# Patient Record
Sex: MALE | Race: WHITE | NOT HISPANIC OR LATINO | Employment: OTHER | URBAN - METROPOLITAN AREA
[De-identification: names, ages, dates, MRNs, and addresses within clinical notes are randomized per-mention and may not be internally consistent; named-entity substitution may affect disease eponyms.]

---

## 2019-09-25 LAB — HBA1C MFR BLD HPLC: 5.7 %

## 2019-09-30 ENCOUNTER — OFFICE VISIT (OUTPATIENT)
Dept: GASTROENTEROLOGY | Facility: CLINIC | Age: 69
End: 2019-09-30
Payer: COMMERCIAL

## 2019-09-30 VITALS
WEIGHT: 241 LBS | HEART RATE: 50 BPM | DIASTOLIC BLOOD PRESSURE: 86 MMHG | HEIGHT: 71 IN | BODY MASS INDEX: 33.74 KG/M2 | SYSTOLIC BLOOD PRESSURE: 122 MMHG

## 2019-09-30 DIAGNOSIS — Z12.11 ENCOUNTER FOR SCREENING COLONOSCOPY: Primary | ICD-10-CM

## 2019-09-30 DIAGNOSIS — R07.89 ATYPICAL CHEST PAIN: ICD-10-CM

## 2019-09-30 DIAGNOSIS — Z80.0 FAMILY HISTORY OF COLON CANCER IN FATHER: ICD-10-CM

## 2019-09-30 DIAGNOSIS — K21.9 GASTROESOPHAGEAL REFLUX DISEASE WITHOUT ESOPHAGITIS: ICD-10-CM

## 2019-09-30 DIAGNOSIS — Z79.01 CURRENT USE OF LONG TERM ANTICOAGULATION: ICD-10-CM

## 2019-09-30 DIAGNOSIS — Z80.0 FAMILY HISTORY OF COLON CANCER IN FATHER: Primary | ICD-10-CM

## 2019-09-30 PROCEDURE — 99214 OFFICE O/P EST MOD 30 MIN: CPT | Performed by: NURSE PRACTITIONER

## 2019-09-30 RX ORDER — ATORVASTATIN CALCIUM 80 MG/1
80 TABLET, FILM COATED ORAL DAILY
COMMUNITY
Start: 2017-06-06

## 2019-09-30 RX ORDER — DOFETILIDE 0.5 MG/1
1 CAPSULE ORAL EVERY 12 HOURS
COMMUNITY

## 2019-09-30 RX ORDER — OMEPRAZOLE 40 MG/1
40 CAPSULE, DELAYED RELEASE ORAL 2 TIMES DAILY
COMMUNITY
Start: 2018-10-28

## 2019-09-30 RX ORDER — METOPROLOL SUCCINATE 50 MG/1
TABLET, EXTENDED RELEASE ORAL
COMMUNITY
Start: 2019-07-29

## 2019-09-30 RX ORDER — AMLODIPINE BESYLATE 10 MG/1
10 TABLET ORAL DAILY
COMMUNITY
Start: 2016-06-15

## 2019-09-30 RX ORDER — LISINOPRIL 2.5 MG/1
2.5 TABLET ORAL DAILY
COMMUNITY
Start: 2018-10-16

## 2019-09-30 NOTE — PROGRESS NOTES
2049 "[id number] [facility] - Outpatient Follow-up Note  Vena Squibb 71 y o  male MRN: [id number]  Encounter: [id number]    ASSESSMENT AND PLAN:      1  Encounter for screening colonoscopy  3  Family history of colon cancer in father  High risk  Due for repeat screening  Last colonoscopy 10/24/2014 showed previous ileal colonic anastomosis in the ascending colon, grade 1 internal hemorrhoids, sigmoid diverticulosis but normal colonic mucosa  Five year recall was recommended  2  Current use of long term anticoagulation  Eliquis for atrial fibrillation and a stroke from 2015  Reviewed with patient the small risk of thromboembolic event while holding the Eliquis selin procedure  The patient understands the risks and benefits of holding the medication and agrees to proceed pending approval of the prescribing provider, Dr Cb Rodarte  4  Gastroesophageal reflux disease without esophagitis  5  Atypical chest pain  Symptoms occur randomly about once per month but last to 20 minutes  No improvement since changing to omeprazole  Cardiac workup has been unremarkable  Last EGD 06/14/2017 was completely normal   This could be esophageal spasm or less likely biliary disease  He would like to follow up with Dr Tania Whitney to address these concerns after his colonoscopy  I advised continuing lifestyle modifications and his daily PPI until that time  Followup Appointment:  4 weeks with Dr Good______________________________________________________________________    Chief Complaint   Patient presents with    Due for colonoscopy     HPI:  70-year-old male patient returns to the office as he is due for his screening colonoscopy  Reports he is in his usual state of health  Denies increased fatigue from his baseline (he does have some fatigue and weakness from his previous stroke in 2015)    Denies fever/chills, chest pain, shortness of breath, nausea/vomiting, heartburn, change in bowel habits, melena, hematochezia, diarrhea or constipation  He has 1-2 normal bowel movements daily  He is experiencing reflux with atypical chest pain described as sharp in the left and right sternal areas lasting about 20 minutes per episode  Cannot relate to specific food triggers or meals but does have increased belching with the episodes  Avoids late night eating and elevates his head at night  He has not tried any other treatments or medications for these symptoms  He did explore the symptoms with the cardiologist in PCP with no cardiac etiology  He had what he describes as normal routine lab work last week per PCP  He did previous lose 40 lb intentionally to improve his health and exercises 3-4 times per week  Appetite is normal   No unintended weight loss      Historical Information   Past Medical History:   Diagnosis Date    Atrial fibrillation (HCC)     LVH    GERD (gastroesophageal reflux disease)     Hyperlipidemia     Hypertension     Stroke ([facility] [address] ) 2015    some residual visual defects      Past Surgical History:   Procedure Laterality Date    APPENDECTOMY      COLON SURGERY Right     Done for appendicitis with cecal abscess    COLONOSCOPY  10/24/2014    COLONOSCOPY  10/24/2014    Previous ileal colonic anastomosis in the ascending colon, grade 1 internal hemorrhoids, sigmoid diverticulosis    EGD  06/14/2017    UPPER GASTROINTESTINAL ENDOSCOPY  06/14/2017    Normal     Social History     Substance and Sexual Activity   Alcohol Use Yes    Frequency: Monthly or less    Drinks per session: 1 or 2    Binge frequency: Never    Comment: Very rare social     Social History     Substance and Sexual Activity   Drug Use Never     Social History     Tobacco Use   Smoking Status Never Smoker   Smokeless Tobacco Never Used     Family History   Problem Relation Age of Onset    Diabetes Mother     Hypertension Mother     Colon cancer Father [de-identified]    Hypertension Father     Diabetes Father     Heart disease Sister     Colon polyps Sister     No Known Problems Brother     Heart disease Sister     Pneumonia Sister     Kidney cancer Sister          Current Outpatient Medications:     amLODIPine (NORVASC) 10 mg tablet    apixaban (ELIQUIS) 5 mg    atorvastatin (LIPITOR) 80 mg tablet    dofetilide (TIKOSYN) 500 mcg capsule    lisinopril (ZESTRIL) 2 5 mg tablet    metoprolol succinate (TOPROL-XL) 50 mg 24 hr tablet    omeprazole (PriLOSEC) 40 MG capsule  No Known Allergies     ROS:  All systems were reviewed and positives reported as per HPI  Also reports occasional leg cramps  All other systems reported as negative  PHYSICAL EXAM:    Blood pressure 122/86, pulse (Abnormal) 50, height 5' 11" (1 803 m), weight 109 kg (241 lb)  Body mass index is 33 61 kg/m²  General Appearance: NAD, cooperative, alert  Head:  Normocephalic, atraumatic  Eyes: Anicteric, PERRLA, EOMI  ENT:   normal external appearance, normal mucosa  Neck:  Supple, symmetrical, trachea midline  Resp:  Clear to auscultation bilaterally; no rales, rhonchi or wheezing; respirations unlabored   CV:  S1 S2, Regular rate and rhythm; no murmur, rub, or gallop  GI:  Soft, non-tender, non-distended; normal bowel sounds; no masses, no organomegaly   Rectal: Deferred  Musculoskeletal: No cyanosis, clubbing or edema  Normal ROM  Skin:  No jaundice, rashes, or lesions   Heme/Lymph: No palpable cervical lymphadenopathy  Psych: Normal affect, good eye contact  Neuro: No gross deficits, AAOx3    Lab Results:  Reviewed lab results from January, 2019  Will obtain recent lab work from [employer] for review  No results found for: WBC, HGB, HCT, MCV, PLT  No results found for: NA, K, CL, CO2, ANIONGAP, BUN, CREATININE, GLUCOSE, GLUF, CALCIUM, CORRECTEDCA, AST, ALT, ALKPHOS, PROT, BILITOT, EGFR  No results found for: IRON, TIBC, FERRITIN  No results found for: LIPASE    Radiology Results:   No results found

## 2021-03-09 DIAGNOSIS — Z23 ENCOUNTER FOR IMMUNIZATION: ICD-10-CM

## 2022-04-06 NOTE — PATIENT INSTRUCTIONS
Group Therapy Note    Date: 4/6/2022    Group Start Time: 1000  Group End Time: 7963  Group Topic: Group Therapy    [facility]    BARBARA Sierra        Group Therapy Note  Pt declined to attend psychotherapy at 1000 am despite encouragement. Pt offered 1:1 and refused.       Attendees: 6/13           Signature:  BARBARA Sierra You will have a colonoscopy here at our endoscopy center  We will contact Dr Ta Cooper about stopping year Eliquis prior to the colonoscopy  Someone will be in touch with you with further instructions

## 2022-12-13 ENCOUNTER — TELEPHONE (OUTPATIENT)
Dept: OTHER | Facility: OTHER | Age: 72
End: 2022-12-13

## 2024-07-31 ENCOUNTER — TELEPHONE (OUTPATIENT)
Age: 74
End: 2024-07-31

## 2024-07-31 NOTE — TELEPHONE ENCOUNTER
07/31/24  Screened by: Lory Ambrosio MA    Referring Provider recall    Pre- Screening:     There is no height or weight on file to calculate BMI.  Has patient been referred for a routine screening Colonoscopy? yes  Is the patient between 45-75 years old? yes      Previous Colonoscopy yes   If yes:    Date: 10-    Facility: Dr. Good    Reason: diverticulosis of the sidmoid colon      SCHEDULING STAFF: If the patient is between 45yrs-49yrs, please advise patient to confirm benefits/coverage with their insurance company for a routine screening colonoscopy, some insurance carriers will only cover at 50yrs or older. If the patient is over 75years old, please schedule an office visit.     Does the patient want to see a Gastroenterologist prior to their procedure OR are they having any GI symptoms? no    Has the patient been hospitalized or had abdominal surgery in the past 6 months? no    Does the patient use supplemental oxygen? no    Does the patient take Coumadin, Lovenox, Plavix, Elliquis, Xarelto, or other blood thinning medication? yes    Has the patient had a stroke, cardiac event, or stent placed in the past year? no    SCHEDULING STAFF: If patient answers NO to above questions, then schedule procedure. If patient answers YES to above questions, then schedule office appointment.     If patient is between 45yrs - 49yrs, please advise patient that we will have to confirm benefits & coverage with their insurance company for a routine screening colonoscopy.

## 2024-09-30 ENCOUNTER — TELEPHONE (OUTPATIENT)
Dept: GASTROENTEROLOGY | Facility: CLINIC | Age: 74
End: 2024-09-30

## 2024-09-30 ENCOUNTER — DOCUMENTATION (OUTPATIENT)
Dept: GASTROENTEROLOGY | Facility: CLINIC | Age: 74
End: 2024-09-30

## 2024-09-30 ENCOUNTER — OFFICE VISIT (OUTPATIENT)
Dept: GASTROENTEROLOGY | Facility: CLINIC | Age: 74
End: 2024-09-30
Payer: COMMERCIAL

## 2024-09-30 VITALS
DIASTOLIC BLOOD PRESSURE: 74 MMHG | BODY MASS INDEX: 33.65 KG/M2 | WEIGHT: 240.4 LBS | SYSTOLIC BLOOD PRESSURE: 152 MMHG | HEIGHT: 71 IN

## 2024-09-30 DIAGNOSIS — K21.9 GASTROESOPHAGEAL REFLUX DISEASE WITHOUT ESOPHAGITIS: ICD-10-CM

## 2024-09-30 DIAGNOSIS — Z79.01 CURRENT USE OF LONG TERM ANTICOAGULATION: ICD-10-CM

## 2024-09-30 DIAGNOSIS — I48.91 ATRIAL FIBRILLATION, UNSPECIFIED TYPE (HCC): ICD-10-CM

## 2024-09-30 DIAGNOSIS — Z80.0 FAMILY HISTORY OF COLON CANCER IN FATHER: Primary | ICD-10-CM

## 2024-09-30 PROCEDURE — 99204 OFFICE O/P NEW MOD 45 MIN: CPT | Performed by: INTERNAL MEDICINE

## 2024-09-30 NOTE — LETTER
September 30, 2024     Britany Joe MD  [address]  [facility] [address]    Patient: Edenilson Avila   YOB: 1950   Date of Visit: 9/30/2024       Dear Dr. Joe:    Thank you for referring Edenilson Avila to me for evaluation. Below are my notes for this consultation.    If you have questions, please do not hesitate to call me. I look forward to following your patient along with you.         Sincerely,        Bi Good MD        CC: MD Bi Mccall MD  9/30/2024  9:56 AM  Sign when Signing Visit  [facility] - Outpatient Consultation  Edenilson Avila 74 y.o. male MRN: [id number]  Encounter: [id number]          ASSESSMENT AND PLAN:      1. Family history of colon cancer in father  Last colonoscopy 2019  - Colonoscopy; Future    2. Atrial fibrillation, unspecified type (HCC)  3. Current use of long term anticoagulation  On chronic Eliquis secondary to atrial fibrillation.  Followed by Dr. Ashlyn Ribeiro at [facility]  -Will hold Eliquis for 2 days prior to procedure if okay with cardiology.  Patient retains the potential thromboembolic risks    4. Gastroesophageal reflux disease without esophagitis  Well on omeprazole    ______________________________________________________________________    HPI: This is 74-year-old gentleman history of GERD atrial fibrillation on Eliquis who is seen in the office today for evaluation for colonoscopy.  His father had colon cancer.  He has been getting a colonoscopy about every 5 years with the last one being 2019.  From a GI standpoint he is doing well.  He has bowels regularly.  Denies any rectal bleeding or abdominal pain.  His weight is stable.  His GERD is adequately controlled with the omeprazole.  He does report some nausea in the morning which he attributes to taking his pills and may be milk.  He denies any vomiting.  Denies any dysphagia,  odynophagia, early satiety.  His weight is stable.      REVIEW OF SYSTEMS:    CONSTITUTIONAL: Denies any fever, chills, rigors, and weight loss.  HEENT: No earache or tinnitus. Denies hearing loss or visual disturbances.  CARDIOVASCULAR: No chest pain or palpitations.   RESPIRATORY: Denies any cough, hemoptysis, shortness of breath or dyspnea on exertion.  GASTROINTESTINAL: As noted in the History of Present Illness.   GENITOURINARY: No problems with urination. Denies any hematuria or dysuria.  NEUROLOGIC: No dizziness or vertigo, denies headaches.   MUSCULOSKELETAL: Denies any muscle or joint pain.   SKIN: Denies skin rashes or itching.   ENDOCRINE: Denies excessive thirst. Denies intolerance to heat or cold.  PSYCHOSOCIAL: Denies depression or anxiety. Denies any recent memory loss.       Historical Information  Past Medical History:   Diagnosis Date   • Atrial fibrillation (HCC)     LVH   • GERD (gastroesophageal reflux disease)    • Hyperlipidemia    • Hypertension    • Stroke (HCC) 2015    some residual visual defects      Past Surgical History:   Procedure Laterality Date   • APPENDECTOMY     • COLON SURGERY Right     Done for appendicitis with cecal abscess   • COLONOSCOPY  10/24/2014   • COLONOSCOPY  10/24/2014    Previous ileal colonic anastomosis in the ascending colon, grade 1 internal hemorrhoids, sigmoid diverticulosis   • EGD  06/14/2017   • FL GUIDED NEEDLE PLAC BX/ASP/INJ  8/25/2021   • UPPER GASTROINTESTINAL ENDOSCOPY  06/14/2017    Normal     Social History  Social History     Substance and Sexual Activity   Alcohol Use Yes    Comment: Very rare social     Social History     Substance and Sexual Activity   Drug Use Never     Social History     Tobacco Use   Smoking Status Never   Smokeless Tobacco Never     Family History   Problem Relation Age of Onset   • Diabetes Mother    • Hypertension Mother    • Colon cancer Father 80   • Hypertension Father    • Diabetes Father    • Heart disease Sister   "  • Colon polyps Sister    • No Known Problems Brother    • Heart disease Sister    • Pneumonia Sister    • Kidney cancer Sister        Meds/Allergies      Current Outpatient Medications:   •  amLODIPine (NORVASC) 10 mg tablet  •  apixaban (ELIQUIS) 5 mg  •  atorvastatin (LIPITOR) 80 mg tablet  •  dofetilide (TIKOSYN) 500 mcg capsule  •  lisinopril (ZESTRIL) 2.5 mg tablet  •  omeprazole (PriLOSEC) 40 MG capsule  •  metoprolol succinate (TOPROL-XL) 50 mg 24 hr tablet    No Known Allergies        Objective    Blood pressure 152/74, height 5' 11\" (1.803 m), weight 109 kg (240 lb 6.4 oz). Body mass index is 33.53 kg/m².        PHYSICAL EXAM:      General Appearance:   Alert, cooperative, no distress   HEENT:   Normocephalic, atraumatic, anicteric.     Neck:  Supple, symmetrical, trachea midline   Lungs:   Clear to auscultation bilaterally; no rales, rhonchi or wheezing; respirations unlabored    Heart::   Regular rate and rhythm; no murmur, rub, or gallop.   Abdomen:   Soft, non-tender, non-distended; normal bowel sounds; no masses, no organomegaly    Genitalia:   Deferred    Rectal:   Deferred    Extremities:  No cyanosis, clubbing or edema    Pulses:  2+ and symmetric    Skin:  No jaundice, rashes, or lesions    Lymph nodes:  No palpable cervical lymphadenopathy        Lab Results:   None recently    "

## 2024-09-30 NOTE — TELEPHONE ENCOUNTER
Scheduled date of colonoscopy (as of today):  10-31-24  Physician performing colonoscopy:  Latisha  Location of colonoscopy:  [facility]  Bowel prep reviewed with patient:  Mahin   gave sample  Instructions reviewed with patient by:olga  Clearances: yes  Eliquis

## 2024-09-30 NOTE — PROGRESS NOTES
[facility] - Outpatient Consultation  Edenilson Avila 74 y.o. male MRN: [id number]  Encounter: [id number]          ASSESSMENT AND PLAN:      1. Family history of colon cancer in father  Last colonoscopy 2019  - Colonoscopy; Future    2. Atrial fibrillation, unspecified type (HCC)  3. Current use of long term anticoagulation  On chronic Eliquis secondary to atrial fibrillation.  Followed by Dr. Ashlyn Ribeiro at [facility]  -Will hold Eliquis for 2 days prior to procedure if okay with cardiology.  Patient retains the potential thromboembolic risks    4. Gastroesophageal reflux disease without esophagitis  Well on omeprazole    ______________________________________________________________________    HPI: This is 74-year-old gentleman history of GERD atrial fibrillation on Eliquis who is seen in the office today for evaluation for colonoscopy.  His father had colon cancer.  He has been getting a colonoscopy about every 5 years with the last one being 2019.  From a GI standpoint he is doing well.  He has bowels regularly.  Denies any rectal bleeding or abdominal pain.  His weight is stable.  His GERD is adequately controlled with the omeprazole.  He does report some nausea in the morning which he attributes to taking his pills and may be milk.  He denies any vomiting.  Denies any dysphagia, odynophagia, early satiety.  His weight is stable.      REVIEW OF SYSTEMS:    CONSTITUTIONAL: Denies any fever, chills, rigors, and weight loss.  HEENT: No earache or tinnitus. Denies hearing loss or visual disturbances.  CARDIOVASCULAR: No chest pain or palpitations.   RESPIRATORY: Denies any cough, hemoptysis, shortness of breath or dyspnea on exertion.  GASTROINTESTINAL: As noted in the History of Present Illness.   GENITOURINARY: No problems with urination. Denies any hematuria or dysuria.  NEUROLOGIC: No dizziness or vertigo, denies headaches.   MUSCULOSKELETAL: Denies  "any muscle or joint pain.   SKIN: Denies skin rashes or itching.   ENDOCRINE: Denies excessive thirst. Denies intolerance to heat or cold.  PSYCHOSOCIAL: Denies depression or anxiety. Denies any recent memory loss.       Historical Information   Past Medical History:   Diagnosis Date    Atrial fibrillation (HCC)     LVH    GERD (gastroesophageal reflux disease)     Hyperlipidemia     Hypertension     Stroke (HCC) 2015    some residual visual defects      Past Surgical History:   Procedure Laterality Date    APPENDECTOMY      COLON SURGERY Right     Done for appendicitis with cecal abscess    COLONOSCOPY  10/24/2014    COLONOSCOPY  10/24/2014    Previous ileal colonic anastomosis in the ascending colon, grade 1 internal hemorrhoids, sigmoid diverticulosis    EGD  06/14/2017    FL GUIDED NEEDLE PLAC BX/ASP/INJ  8/25/2021    UPPER GASTROINTESTINAL ENDOSCOPY  06/14/2017    Normal     Social History   Social History     Substance and Sexual Activity   Alcohol Use Yes    Comment: Very rare social     Social History     Substance and Sexual Activity   Drug Use Never     Social History     Tobacco Use   Smoking Status Never   Smokeless Tobacco Never     Family History   Problem Relation Age of Onset    Diabetes Mother     Hypertension Mother     Colon cancer Father 80    Hypertension Father     Diabetes Father     Heart disease Sister     Colon polyps Sister     No Known Problems Brother     Heart disease Sister     Pneumonia Sister     Kidney cancer Sister        Meds/Allergies       Current Outpatient Medications:     amLODIPine (NORVASC) 10 mg tablet    apixaban (ELIQUIS) 5 mg    atorvastatin (LIPITOR) 80 mg tablet    dofetilide (TIKOSYN) 500 mcg capsule    lisinopril (ZESTRIL) 2.5 mg tablet    omeprazole (PriLOSEC) 40 MG capsule    metoprolol succinate (TOPROL-XL) 50 mg 24 hr tablet    No Known Allergies        Objective     Blood pressure 152/74, height 5' 11\" (1.803 m), weight 109 kg (240 lb 6.4 oz). Body mass index " is 33.53 kg/m².        PHYSICAL EXAM:      General Appearance:   Alert, cooperative, no distress   HEENT:   Normocephalic, atraumatic, anicteric.     Neck:  Supple, symmetrical, trachea midline   Lungs:   Clear to auscultation bilaterally; no rales, rhonchi or wheezing; respirations unlabored    Heart::   Regular rate and rhythm; no murmur, rub, or gallop.   Abdomen:   Soft, non-tender, non-distended; normal bowel sounds; no masses, no organomegaly    Genitalia:   Deferred    Rectal:   Deferred    Extremities:  No cyanosis, clubbing or edema    Pulses:  2+ and symmetric    Skin:  No jaundice, rashes, or lesions    Lymph nodes:  No palpable cervical lymphadenopathy        Lab Results:   None recently

## 2024-10-02 NOTE — TELEPHONE ENCOUNTER
Provider: Latisha  Procedure: Colon  Scheduled Date: 10-31-24  Location: [facility]  Medication(s) to stop: Eliquis  Days to hold: 2  Last dose should be: 10-28-24  Requested from: [facility]  Date requested: 9-30-24  Date received:  10-1-24  Patient alerted:

## 2024-10-17 ENCOUNTER — ANESTHESIA EVENT (OUTPATIENT)
Dept: ANESTHESIOLOGY | Facility: AMBULATORY SURGERY CENTER | Age: 74
End: 2024-10-17

## 2024-10-17 ENCOUNTER — ANESTHESIA (OUTPATIENT)
Dept: ANESTHESIOLOGY | Facility: AMBULATORY SURGERY CENTER | Age: 74
End: 2024-10-17

## 2024-10-21 ENCOUNTER — TELEPHONE (OUTPATIENT)
Dept: GASTROENTEROLOGY | Facility: CLINIC | Age: 74
End: 2024-10-21

## 2024-10-21 NOTE — TELEPHONE ENCOUNTER
Procedure confirmed  Colonoscopy     Via: Voice mail    Instructions given: Given to Patient at Visit     Prep Given: Clenpiq    Call the office if there are any questions.

## 2024-10-29 ENCOUNTER — TELEPHONE (OUTPATIENT)
Dept: GASTROENTEROLOGY | Facility: CLINIC | Age: 74
End: 2024-10-29

## 2024-10-29 NOTE — TELEPHONE ENCOUNTER
Procedure rescheduled due to not stopping Eliquis.    Scheduled date of colonoscopy (as of today):11/01/2024  Physician performing colonoscopy:Dr Uribe  Location of colonoscopy:[facility]  Bowel prep reviewed with patient:Clenpiq (sample given)  Patient has instructions   Clearances: Eliquis

## 2024-11-01 ENCOUNTER — HOSPITAL ENCOUNTER (OUTPATIENT)
Dept: GASTROENTEROLOGY | Facility: AMBULATORY SURGERY CENTER | Age: 74
Discharge: HOME | End: 2024-11-01
Payer: COMMERCIAL

## 2024-11-01 ENCOUNTER — ANESTHESIA EVENT (OUTPATIENT)
Dept: GASTROENTEROLOGY | Facility: AMBULATORY SURGERY CENTER | Age: 74
End: 2024-11-01

## 2024-11-01 ENCOUNTER — ANESTHESIA (OUTPATIENT)
Dept: GASTROENTEROLOGY | Facility: AMBULATORY SURGERY CENTER | Age: 74
End: 2024-11-01

## 2024-11-01 VITALS
BODY MASS INDEX: 33.6 KG/M2 | TEMPERATURE: 97.8 F | HEART RATE: 62 BPM | OXYGEN SATURATION: 96 % | DIASTOLIC BLOOD PRESSURE: 71 MMHG | RESPIRATION RATE: 18 BRPM | SYSTOLIC BLOOD PRESSURE: 123 MMHG | HEIGHT: 71 IN | WEIGHT: 240 LBS

## 2024-11-01 DIAGNOSIS — Z80.0 FAMILY HISTORY OF COLON CANCER IN FATHER: ICD-10-CM

## 2024-11-01 PROCEDURE — G0105 COLORECTAL SCRN; HI RISK IND: HCPCS | Performed by: INTERNAL MEDICINE

## 2024-11-01 RX ORDER — EPHEDRINE SULFATE 50 MG/ML
INJECTION INTRAVENOUS AS NEEDED
Status: DISCONTINUED | OUTPATIENT
Start: 2024-11-01 | End: 2024-11-01

## 2024-11-01 RX ORDER — SODIUM CHLORIDE, SODIUM LACTATE, POTASSIUM CHLORIDE, CALCIUM CHLORIDE 600; 310; 30; 20 MG/100ML; MG/100ML; MG/100ML; MG/100ML
50 INJECTION, SOLUTION INTRAVENOUS CONTINUOUS
Status: DISCONTINUED | OUTPATIENT
Start: 2024-11-01 | End: 2024-11-05 | Stop reason: HOSPADM

## 2024-11-01 RX ORDER — PROPOFOL 10 MG/ML
INJECTION, EMULSION INTRAVENOUS AS NEEDED
Status: DISCONTINUED | OUTPATIENT
Start: 2024-11-01 | End: 2024-11-01

## 2024-11-01 RX ORDER — LIDOCAINE HYDROCHLORIDE 10 MG/ML
INJECTION, SOLUTION EPIDURAL; INFILTRATION; INTRACAUDAL; PERINEURAL AS NEEDED
Status: DISCONTINUED | OUTPATIENT
Start: 2024-11-01 | End: 2024-11-01

## 2024-11-01 RX ADMIN — EPHEDRINE SULFATE 5 MG: 50 INJECTION INTRAVENOUS at 09:42

## 2024-11-01 RX ADMIN — PROPOFOL 30 MG: 10 INJECTION, EMULSION INTRAVENOUS at 09:30

## 2024-11-01 RX ADMIN — LIDOCAINE HYDROCHLORIDE 30 MG: 10 INJECTION, SOLUTION EPIDURAL; INFILTRATION; INTRACAUDAL; PERINEURAL at 09:29

## 2024-11-01 RX ADMIN — PROPOFOL 30 MG: 10 INJECTION, EMULSION INTRAVENOUS at 09:37

## 2024-11-01 RX ADMIN — SODIUM CHLORIDE, SODIUM LACTATE, POTASSIUM CHLORIDE, CALCIUM CHLORIDE 50 ML/HR: 600; 310; 30; 20 INJECTION, SOLUTION INTRAVENOUS at 09:24

## 2024-11-01 RX ADMIN — PROPOFOL 100 MG: 10 INJECTION, EMULSION INTRAVENOUS at 09:29

## 2024-11-01 RX ADMIN — PROPOFOL 20 MG: 10 INJECTION, EMULSION INTRAVENOUS at 09:31

## 2024-11-01 RX ADMIN — PROPOFOL 20 MG: 10 INJECTION, EMULSION INTRAVENOUS at 09:43

## 2024-11-01 RX ADMIN — PROPOFOL 30 MG: 10 INJECTION, EMULSION INTRAVENOUS at 09:33

## 2024-11-01 RX ADMIN — PROPOFOL 20 MG: 10 INJECTION, EMULSION INTRAVENOUS at 09:34

## 2024-11-01 NOTE — H&P
History and Physical - [facility]  Edenilson Avila 74 y.o. male MRN: [id number]    HPI: Edenilson Avila is a 74 y.o. year old male who presents for colon cancer screening, family history of colon cancer    REVIEW OF SYSTEMS: Per the HPI, and otherwise unremarkable.    Historical Information   Past Medical History:   Diagnosis Date    Atrial fibrillation (HCC)     LVH    CPAP (continuous positive airway pressure) dependence     GERD (gastroesophageal reflux disease)     Hyperlipidemia     Hypertension     Sleep apnea     Stroke (HCC) 2015    some residual visual defects      Past Surgical History:   Procedure Laterality Date    APPENDECTOMY      COLON SURGERY Right     Done for appendicitis with cecal abscess    COLONOSCOPY  10/24/2014    COLONOSCOPY  10/24/2014    Previous ileal colonic anastomosis in the ascending colon, grade 1 internal hemorrhoids, sigmoid diverticulosis    EGD  06/14/2017    FL GUIDED NEEDLE PLAC BX/ASP/INJ  8/25/2021    UPPER GASTROINTESTINAL ENDOSCOPY  06/14/2017    Normal     Social History   Social History     Substance and Sexual Activity   Alcohol Use Yes    Comment: Very rare social     Social History     Substance and Sexual Activity   Drug Use Never     Social History     Tobacco Use   Smoking Status Never   Smokeless Tobacco Never     Family History   Problem Relation Age of Onset    Diabetes Mother     Hypertension Mother     Colon cancer Father 80    Hypertension Father     Diabetes Father     Heart disease Sister     Colon polyps Sister     No Known Problems Brother     Heart disease Sister     Pneumonia Sister     Kidney cancer Sister        Meds/Allergies       Current Outpatient Medications:     amLODIPine (NORVASC) 10 mg tablet    atorvastatin (LIPITOR) 80 mg tablet    dofetilide (TIKOSYN) 500 mcg capsule    lisinopril (ZESTRIL) 2.5 mg tablet    omeprazole (PriLOSEC) 40 MG capsule    apixaban (ELIQUIS) 5 mg    metoprolol succinate (TOPROL-XL) 50 mg 24 hr  tablet    Current Facility-Administered Medications:     lactated ringers infusion, 50 mL/hr, Intravenous, Continuous    No Known Allergies    Objective     There were no vitals taken for this visit.    PHYSICAL EXAM    Gen: NAD AAOx3  Head: Normocephalic, Atraumatic  CV: S1S2 RRR no m/r/g  CHEST: Clear b/l no c/r/w  ABD: soft, +BS NT/ND  EXT: no edema    ASSESSMENT/PLAN:  This is a 74 y.o. year old male here for colonoscopy, and he is stable and optimized for his procedure.

## 2024-11-01 NOTE — ANESTHESIA PREPROCEDURE EVALUATION
Procedure:  COLONOSCOPY    Relevant Problems   ANESTHESIA (within normal limits)      CARDIO   (+) Atrial fibrillation (HCC)   (+) Atypical chest pain      ENDO (within normal limits)      GI/HEPATIC   (+) Gastroesophageal reflux disease without esophagitis      /RENAL (within normal limits)      GYN (within normal limits)      HEMATOLOGY (within normal limits)      MUSCULOSKELETAL (within normal limits)      NEURO/PSYCH (within normal limits)      PULMONARY (within normal limits)        Physical Exam    Airway       Dental       Cardiovascular  Cardiovascular exam normal    Pulmonary  Pulmonary exam normal     Other Findings        Anesthesia Plan  ASA Score- 3     Anesthesia Type- IV sedation with anesthesia with ASA Monitors.         Additional Monitors:     Airway Plan:            Plan Factors-Exercise tolerance (METS): >4 METS.    Chart reviewed.    Patient summary reviewed.    Patient is not a current smoker. Patient not instructed to abstain from smoking on day of procedure. Patient did not smoke on day of surgery.            Induction-     Postoperative Plan-         Informed Consent- Anesthetic plan and risks discussed with patient.  I personally reviewed this patient with the CRNA. Discussed and agreed on the Anesthesia Plan with the CRNA..

## 2024-11-01 NOTE — ANESTHESIA POSTPROCEDURE EVALUATION
Post-Op Assessment Note    CV Status:  Stable    Pain management: adequate       Mental Status:  Sleepy   Hydration Status:  Euvolemic   PONV Controlled:  Controlled   Airway Patency:  Patent     Post Op Vitals Reviewed: Yes    No anethesia notable event occurred.    Staff: CRNA           Last Filed PACU Vitals:  Vitals Value Taken Time   Temp     Pulse     BP     Resp     SpO2         Modified Darrin:  Activity: 2 (11/1/2024  9:10 AM)  Respiration: 2 (11/1/2024  9:10 AM)  Circulation: 2 (11/1/2024  9:10 AM)  Consciousness: 2 (11/1/2024  9:10 AM)  Oxygen Saturation: 2 (11/1/2024  9:10 AM)  Modified Darrin Score: 10 (11/1/2024  9:10 AM)